# Patient Record
Sex: FEMALE | Race: BLACK OR AFRICAN AMERICAN | NOT HISPANIC OR LATINO | Employment: UNEMPLOYED | URBAN - METROPOLITAN AREA
[De-identification: names, ages, dates, MRNs, and addresses within clinical notes are randomized per-mention and may not be internally consistent; named-entity substitution may affect disease eponyms.]

---

## 2023-07-11 DIAGNOSIS — O35.9XX0 SUSPECTED FETAL ANOMALY, ANTEPARTUM, SINGLE OR UNSPECIFIED FETUS: Primary | ICD-10-CM

## 2023-07-13 ENCOUNTER — OFFICE VISIT (OUTPATIENT)
Dept: MATERNAL FETAL MEDICINE | Facility: CLINIC | Age: 18
End: 2023-07-13
Payer: MEDICAID

## 2023-07-13 VITALS
SYSTOLIC BLOOD PRESSURE: 130 MMHG | RESPIRATION RATE: 20 BRPM | HEIGHT: 64 IN | DIASTOLIC BLOOD PRESSURE: 70 MMHG | BODY MASS INDEX: 31.24 KG/M2 | WEIGHT: 183 LBS | HEART RATE: 101 BPM | OXYGEN SATURATION: 99 %

## 2023-07-13 DIAGNOSIS — O35.9XX0 SUSPECTED FETAL ANOMALY, ANTEPARTUM, SINGLE OR UNSPECIFIED FETUS: ICD-10-CM

## 2023-07-13 PROCEDURE — 76805 OB US >/= 14 WKS SNGL FETUS: CPT | Mod: S$GLB,,, | Performed by: OBSTETRICS & GYNECOLOGY

## 2023-07-13 PROCEDURE — 3075F PR MOST RECENT SYSTOLIC BLOOD PRESS GE 130-139MM HG: ICD-10-PCS | Mod: CPTII,S$GLB,, | Performed by: OBSTETRICS & GYNECOLOGY

## 2023-07-13 PROCEDURE — 3078F PR MOST RECENT DIASTOLIC BLOOD PRESSURE < 80 MM HG: ICD-10-PCS | Mod: CPTII,S$GLB,, | Performed by: OBSTETRICS & GYNECOLOGY

## 2023-07-13 PROCEDURE — 1159F MED LIST DOCD IN RCRD: CPT | Mod: CPTII,S$GLB,, | Performed by: OBSTETRICS & GYNECOLOGY

## 2023-07-13 PROCEDURE — 99203 PR OFFICE/OUTPT VISIT, NEW, LEVL III, 30-44 MIN: ICD-10-PCS | Mod: TH,S$GLB,, | Performed by: OBSTETRICS & GYNECOLOGY

## 2023-07-13 PROCEDURE — 3008F BODY MASS INDEX DOCD: CPT | Mod: CPTII,S$GLB,, | Performed by: OBSTETRICS & GYNECOLOGY

## 2023-07-13 PROCEDURE — 3075F SYST BP GE 130 - 139MM HG: CPT | Mod: CPTII,S$GLB,, | Performed by: OBSTETRICS & GYNECOLOGY

## 2023-07-13 PROCEDURE — 3008F PR BODY MASS INDEX (BMI) DOCUMENTED: ICD-10-PCS | Mod: CPTII,S$GLB,, | Performed by: OBSTETRICS & GYNECOLOGY

## 2023-07-13 PROCEDURE — 99203 OFFICE O/P NEW LOW 30 MIN: CPT | Mod: TH,S$GLB,, | Performed by: OBSTETRICS & GYNECOLOGY

## 2023-07-13 PROCEDURE — 76805 PR US, OB 14+WKS, TRANSABD, SINGLE GESTATION: ICD-10-PCS | Mod: S$GLB,,, | Performed by: OBSTETRICS & GYNECOLOGY

## 2023-07-13 PROCEDURE — 1159F PR MEDICATION LIST DOCUMENTED IN MEDICAL RECORD: ICD-10-PCS | Mod: CPTII,S$GLB,, | Performed by: OBSTETRICS & GYNECOLOGY

## 2023-07-13 PROCEDURE — 3078F DIAST BP <80 MM HG: CPT | Mod: CPTII,S$GLB,, | Performed by: OBSTETRICS & GYNECOLOGY

## 2023-07-13 NOTE — PROGRESS NOTES
Initial [facility] Consultation  Consulting provider: Dr. Vale Lazcano  Referring provider: Ashley Romo CNM    Indications for referral:  1) Pregnancy at 14 weeks and 4 days gestation (EDC 1-7-24)  2) NIPT with low fetal fraction    Dear Ms. Romo,  Thank you for your kind request for consultation and imaging of your patient at the [facility].  She presents due to the above listed indications.  As you know she is a 16yo G1.  She had an NIPT with a fetal fraction of 2.2% which is low. She has had no other complications this pregnancy.    The patient has no significant past medical or surgical history.   Social: Denies tobacco, alcohol, and illicit substance use.  ALL:NKDA  MEDS:PNV  ROS: No complaints    Physical Exam  Vital signs: 130/70, 101, 20  General: Age appearing female in no apparent distress.  HEENT:  Normal external facial features. No scleral icterus.  CHEST:  Normal respiratory effort and able to speak in sentences without difficulty.  ABDOMEN:  Gravid, soft, nontender  EXTREMITIES: Without clubbing, cyanosis, edema  NEURO: No focal deficits.  MENTAL STATUS: No focal deficits.    ULTRASOUND FINDINGS:  A 14 week ultrasound was performed. A single live intrauterine pregnancy was observed.  There are no gross malformation, but most of the anatomy was suboptimally visualized due to early gestation.  The placenta is anterior.  Amniotic fluid is normal.     IMPRESSION:     1) 14 week gestation  2) NIPT with low fetal fraction    RECOMMENDATIONS/DISCUSSION:  The patient was advised that she had a screening test for Trisomies 21, 18, and 13.  Screening tests tell you whether there is a high risk or low risk for certain conditions.  She was advised that there was inadequate genetic material in her blood stream to provide her with a risk assessment for these conditions. This is otherwise known as a low fetal fraction.  With this particular company, a low fetal fraction  has been associated with an increased risk for Triploidy, Trisomy 18 or Trisomy 13.  Again, this finding is not diagnostic.  The patient was advised of the availability of diagnostic testing with amniocentesis for karyotype.  We discussed the risks associated with amniocentesis.  We also discussed ultrasound as a different type of screening test that usually detects at least some abnormalities when Trisomy 18, 13, or Triploidy are present.  She is not interested in amniocentesis at this time, and would like additional ultrasounds to screen for Triploidy, Trisomy 18, and Trisomy 13.  I informed her that we will see more detailed anatomy when she comes for her scan at 18 weeks. We will provide additional counseling at that time and based on those findings.    Thank you for allowing us to participate in her care.  Please do not hesitate to call with questions.  For any questions feel free to call our oncall MFM 24/7 at [phone].PEDRO Lazcano MD

## 2023-07-13 NOTE — PROGRESS NOTES
"Fanta is here for initial MFM consultation, referred by Ashley Romo CNM at Dr. Mckenzie Jr.s office for abnormal NIPT.    She is not feeling fetal movement.    Fanta denies vaginal bleeding, loss of fluid, recurrent cramping; nausea and emesis resolving.      Vitals:    07/13/23 1412   BP: 130/70   Pulse: 101   Resp: 20   SpO2: 99%   Weight: 83 kg (183 lb)   Height: 5' 4" (1.626 m)      BMI:                    31.41 kg/m^2               "

## 2023-07-13 NOTE — LETTER
July 13, 2023        Ashley Romo CNM  [address] [facility] [address]             [place] [facility]  [address]  [place] [address]  Phone: [phone]  Fax: [phone]   Patient: Fanta García   MR Number: [id number]   YOB: 2005   Date of Visit: 7/13/2023       Dear Ms Romo:    Thank you for referring Fanta García to me for evaluation. Attached you will find relevant portions of my assessment and plan of care.    If you have questions, please do not hesitate to call me. I look forward to following Fanta García along with you.    Sincerely,      Vale Lazcano MD            CC  No Recipients    Enclosure

## 2023-08-08 DIAGNOSIS — O35.9XX0 SUSPECTED FETAL ANOMALY, ANTEPARTUM, SINGLE OR UNSPECIFIED FETUS: Primary | ICD-10-CM

## 2023-08-14 ENCOUNTER — PROCEDURE VISIT (OUTPATIENT)
Dept: MATERNAL FETAL MEDICINE | Facility: CLINIC | Age: 18
End: 2023-08-14
Payer: MEDICAID

## 2023-08-14 VITALS
HEART RATE: 109 BPM | OXYGEN SATURATION: 99 % | BODY MASS INDEX: 31.58 KG/M2 | DIASTOLIC BLOOD PRESSURE: 70 MMHG | WEIGHT: 184 LBS | RESPIRATION RATE: 18 BRPM | SYSTOLIC BLOOD PRESSURE: 118 MMHG

## 2023-08-14 DIAGNOSIS — O35.9XX0 SUSPECTED FETAL ANOMALY, ANTEPARTUM, SINGLE OR UNSPECIFIED FETUS: ICD-10-CM

## 2023-08-14 PROCEDURE — 99213 PR OFFICE/OUTPT VISIT, EST, LEVL III, 20-29 MIN: ICD-10-PCS | Mod: TH,S$GLB,, | Performed by: OBSTETRICS & GYNECOLOGY

## 2023-08-14 PROCEDURE — 76811 PR US, OB FETAL EVAL & EXAM, TRANSABDOM,FIRST GESTATION: ICD-10-PCS | Mod: S$GLB,,, | Performed by: OBSTETRICS & GYNECOLOGY

## 2023-08-14 PROCEDURE — 76811 OB US DETAILED SNGL FETUS: CPT | Mod: S$GLB,,, | Performed by: OBSTETRICS & GYNECOLOGY

## 2023-08-14 PROCEDURE — 99213 OFFICE O/P EST LOW 20 MIN: CPT | Mod: TH,S$GLB,, | Performed by: OBSTETRICS & GYNECOLOGY

## 2023-08-14 NOTE — PROGRESS NOTES
Follow-up [facility] consultation note:  Aaron Blandon MD    Reason for consultation:     1. [place] gestation at 19 weeks  2. Cell free DNA analysis with low fetal fraction  3. Patient has declined amniocentesis    Dear Ashley Romo CNM    Today, I had the opportunity to see your patient in follow-up at the [facility].  I greatly appreciate your request for follow-up imaging and consultation.  Your patient is a 17-year-old primigravida at 19 weeks and 1 day gestation.  Previously, she has been seen by my partner Dr. Lazcano.  Her note has been reviewed.  Her note focused on the increased risk of aneuploidy with a cell free DNA analysis that has a low fetal fraction.  The pros and cons of invasive genetic testing were discussed with the patient.  She declined.  She returns for repeat assessment today.  She denies cramping, and vaginal bleeding    Physical examination    General:  She is a well-developed well-nourished female in no apparent distress  Vital signs:  Blood pressure 118/70, pulse 109, respirations 18, pulse oximetry 99%, weight 184 lb  HEENT:  Grossly within normal limits.  There is no facial edema.  The sclera appears normal.  Abdomen:  Benign exam.  The uterus is nontender to palpation.  The uterus is appropriate size.  Extremities:  No ankle edema is palpable.  Skin:  There is no rash or lesions.  Neuro:  Grossly intact  Psych:  The patient is appropriate for both mood and affect.      Imaging:  [facility] imaging was repeated today.  A full ultrasound report has been created in the [employer] system and a copy will be placed in the EMR and will also be forwarded to you separately.  In summary, imaging today is reassuring and would not predict a increased probability of aneuploidy.  It certainly does not rule out conclusively but makes it of low probability.  As seen on the report the anatomic structures were without abnormality and fetal growth was totally normal.  The fluid volume  was normal too.    Counseling:  Your patient was counseled today that I feel the probability of aneuploidy is low.  It is true that a low fetal fraction does have a higher risk of fetal chromosomal abnormality but the risk is not excessively high.  I pointed out that the baby is normally grown today.  Many babies with chromosomal abnormality would have significant growth restriction saw find the normal growth reassuring.  She was also informed that I felt a final assessment here in the [facility] would be appropriate.  I suggested a visit in about 7 weeks and she accepted.    Impression:  Low fetal fraction on cell free DNA analysis with normal fetal assessment on 2 occasions.  Normal amniotic fluid volume and growth.  No evidence of placental abnormality.    Recommendations:    1. With your permission we will see the patient back here in 7 weeks    2. Your patient was encouraged to live a healthy lifestyle with a well-rounded diet, increased activity, and increased hydration and this time a very hot weather.    Please feel free to call me if you have any questions about the care of your patient.  The [facility] group can be reached 24 hours a day at [phone].  I greatly appreciate the opportunity to participate in the care of your patient.    Sincerely, Aaron Blandon MD

## 2023-08-14 NOTE — LETTER
August 14, 2023        Ashley Romo CNM  [address] [facility] [address]             [place] [facility]  [address]  [place] [address]  Phone: [phone]  Fax: [phone]   Patient: Fanta García   MR Number: [id number]   YOB: 2005   Date of Visit: 8/14/2023       Dear Ms Romo:    Thank you for referring Fanta García to me for evaluation. Below are the relevant portions of my assessment and plan of care.            If you have questions, please do not hesitate to call me. I look forward to following Fanta along with you.    Sincerely,      Aaron Yanez MD           CC  No Recipients

## 2023-08-14 NOTE — PROGRESS NOTES
Fanta is here for followup [facility] consultation for abnormal NIPT, referred by Ashley Romo CNM at Dr. Mckenzie Jr's office. She previously declined amniocentesis.    She is feeling fetal movement.    Fanta denies vaginal bleeding, loss of fluid, recurrent cramping.    She is not taking any medications.    Vitals:    08/14/23 1123   BP: 118/70   Pulse: 109   Resp: 18   SpO2: 99%   Weight: 83.5 kg (184 lb)     BMI:                    31.58 kg/m^2

## 2023-09-25 DIAGNOSIS — O35.9XX0 SUSPECTED FETAL ANOMALY, ANTEPARTUM, SINGLE OR UNSPECIFIED FETUS: Primary | ICD-10-CM

## 2023-10-05 ENCOUNTER — PROCEDURE VISIT (OUTPATIENT)
Dept: MATERNAL FETAL MEDICINE | Facility: CLINIC | Age: 18
End: 2023-10-05
Payer: MEDICAID

## 2023-10-05 VITALS
OXYGEN SATURATION: 98 % | HEART RATE: 96 BPM | SYSTOLIC BLOOD PRESSURE: 110 MMHG | RESPIRATION RATE: 20 BRPM | WEIGHT: 194 LBS | DIASTOLIC BLOOD PRESSURE: 68 MMHG | BODY MASS INDEX: 33.3 KG/M2

## 2023-10-05 DIAGNOSIS — O35.9XX0 SUSPECTED FETAL ANOMALY, ANTEPARTUM, SINGLE OR UNSPECIFIED FETUS: ICD-10-CM

## 2023-10-05 PROCEDURE — 99213 OFFICE O/P EST LOW 20 MIN: CPT | Mod: 25,TH,S$GLB, | Performed by: OBSTETRICS & GYNECOLOGY

## 2023-10-05 PROCEDURE — 99213 PR OFFICE/OUTPT VISIT, EST, LEVL III, 20-29 MIN: ICD-10-PCS | Mod: 25,TH,S$GLB, | Performed by: OBSTETRICS & GYNECOLOGY

## 2023-10-05 PROCEDURE — 76816 OB US FOLLOW-UP PER FETUS: CPT | Mod: S$GLB,,, | Performed by: OBSTETRICS & GYNECOLOGY

## 2023-10-05 PROCEDURE — 76816 PR  US,PREGNANT UTERUS,F/U,TRANSABD APP: ICD-10-PCS | Mod: S$GLB,,, | Performed by: OBSTETRICS & GYNECOLOGY

## 2023-10-05 NOTE — LETTER
October 5, 2023        Ashley Romo CNM  [address] [facility] [address]             [place] [facility]  [address]  [place] [address]  Phone: [phone]  Fax: [phone]   Patient: Fanta García   MR Number: [id number]   YOB: 2005   Date of Visit: 10/5/2023       Dear Ms. Romo:    Thank you for referring Fanta García to me for evaluation. Below are the relevant portions of my assessment and plan of care.            If you have questions, please do not hesitate to call me. I look forward to following Fanta along with you.    Sincerely,      Aaron Yanez MD           CC  No Recipients

## 2023-10-05 NOTE — PROGRESS NOTES
Follow-up [facility] consultation note:  Aaron Blandon MD    Reason for consultation:     1. Pregnancy at 26 weeks' gestation  2. Abnormal cell free DNA analysis based upon a low fetal fraction  3. Patient declines amniocentesis  4. 2 prior anatomic surveys are negative for markers for aneuploidy and were without evidence of fetal growth restriction     Dear    Today, I had the opportunity to see your patient in follow-up at the [facility].  I greatly appreciate your request for follow-up imaging and consultation.  Your patient is a 18-year-old primigravida with a due date of January 7, 2024.  Since her last visit in the [facility] your patient has done well.  Specifically, she denies vaginal bleeding, leakage of fluid, uterine contractions.    Physical examination    General:  This is a well-developed well-nourished gravid female in no apparent distress  Vital signs:  Blood pressure 130/70, pulse 101, respirations 20, weight 183 lb  HEENT:  Grossly within normal limits.  There is no facial edema.  The sclera appears normal.  Abdomen:  Benign exam.  The uterus is nontender to palpation.  The uterus is appropriate size.  Extremities:  No ankle edema is palpable.  Skin:  There is no rash or lesions.  Neuro:  Grossly intact  Psych:  The patient is appropriate for both mood and affect.      Imaging:  [facility] imaging was repeated today.  A full ultrasound report has been created in the [employer] system and a copy will be placed in the EMR and will also be forwarded to you separately.  In summary, imaging of this fetus is reassuring and all parameters.  This would include growth, fluid, and anatomy.    Counseling:  Your patient was counseled from my position of reassurance that the probability of anything significantly wrong with the baby is low.  However, it is not ruled out conclusively.  She was informed that I do not think that a follow-up visit here in the [facility] is required.  Routine obstetric  care should be sufficient.    Impression:  Reassuring assessment today of both mother and fetus.  The patient does not show evidence of preeclampsia symptoms are of premature labor.  As described above, the fetal assessment still totally normal.    Recommendations:    1. Routine obstetric care in your office should be sufficient.  The patient should continue to live a healthy lifestyle with a well-rounded diet and a modest amount of exercise.    2. No follow-up in the [facility] is advised her scheduled at this time.  Please reconsult as indicated.    Please feel free to call me if you have any questions about the care of your patient.  The [facility] group can be reached 24 hours a day at [phone].  I greatly appreciate the opportunity to participate in the care of your patient.    Sincerely, Aaron Blandon MD

## 2023-10-05 NOTE — PROGRESS NOTES
Fanta is here for followup M consultation for abnormal NIPT result with low fetal fraction , referred by Ashley Romo CNM.    She is feeling fetal movement.    Fanta denies vaginal bleeding, loss of fluid, recurrent contractions.    She is only taking supplemental Fe daily.    Vitals:    10/05/23 1013   BP: 110/68   Pulse: 96   Resp: 20   SpO2: 98%   Weight: 88 kg (194 lb)     BMI:                    33.3 kg/m^2